# Patient Record
Sex: FEMALE | Race: BLACK OR AFRICAN AMERICAN
[De-identification: names, ages, dates, MRNs, and addresses within clinical notes are randomized per-mention and may not be internally consistent; named-entity substitution may affect disease eponyms.]

---

## 2018-10-12 ENCOUNTER — HOSPITAL ENCOUNTER (EMERGENCY)
Dept: HOSPITAL 61 - ER | Age: 48
Discharge: HOME | End: 2018-10-12
Payer: COMMERCIAL

## 2018-10-12 VITALS — SYSTOLIC BLOOD PRESSURE: 126 MMHG | DIASTOLIC BLOOD PRESSURE: 68 MMHG

## 2018-10-12 VITALS — BODY MASS INDEX: 43.4 KG/M2 | HEIGHT: 69 IN | WEIGHT: 293 LBS

## 2018-10-12 DIAGNOSIS — N30.01: Primary | ICD-10-CM

## 2018-10-12 DIAGNOSIS — N83.201: ICD-10-CM

## 2018-10-12 DIAGNOSIS — J98.4: ICD-10-CM

## 2018-10-12 DIAGNOSIS — M54.5: ICD-10-CM

## 2018-10-12 DIAGNOSIS — K57.30: ICD-10-CM

## 2018-10-12 DIAGNOSIS — K75.3: ICD-10-CM

## 2018-10-12 DIAGNOSIS — K80.20: ICD-10-CM

## 2018-10-12 LAB
APTT PPP: (no result) S
BACTERIA #/AREA URNS HPF: (no result) /HPF
BILIRUB UR QL STRIP: NEGATIVE
FIBRINOGEN PPP-MCNC: (no result) MG/DL
NITRITE UR QL STRIP: NEGATIVE
PH UR STRIP: 6 [PH]
PROT UR STRIP-MCNC: 100 MG/DL
RBC #/AREA URNS HPF: (no result) /HPF (ref 0–2)
SQUAMOUS #/AREA URNS LPF: (no result) /LPF
U PREG PATIENT: NEGATIVE
UROBILINOGEN UR-MCNC: 1 MG/DL
WBC #/AREA URNS HPF: (no result) /HPF (ref 0–4)

## 2018-10-12 PROCEDURE — 96372 THER/PROPH/DIAG INJ SC/IM: CPT

## 2018-10-12 PROCEDURE — 74176 CT ABD & PELVIS W/O CONTRAST: CPT

## 2018-10-12 PROCEDURE — 81001 URINALYSIS AUTO W/SCOPE: CPT

## 2018-10-12 PROCEDURE — 82962 GLUCOSE BLOOD TEST: CPT

## 2018-10-12 PROCEDURE — 81025 URINE PREGNANCY TEST: CPT

## 2018-10-12 PROCEDURE — 99285 EMERGENCY DEPT VISIT HI MDM: CPT

## 2018-10-12 NOTE — RAD
CT abdomen and pelvis without contrast:

 

Reason for examination: Hematuria.

 

Helical images were obtained through the abdomen pelvis with no 

intravenous or oral contrast. Reconstruction was performed in sagittal and

coronal planes.

 

Exposure: One or more of the following individualized dose reduction 

techniques were utilized for this examination:  1. Automated exposure 

control  2. Adjustment of the mA and/or kV according to patient size  3. 

Use of iterative reconstruction technique.

 

There is a calcified granuloma posteriorly at the left lung base. No 

infiltrates or pleural effusions are seen. The heart size is normal with 

no pericardial effusion evident. Calcified granuloma are seen in the 

subcutaneous carinal region of the mediastinum.

 

There are several calcifications present in the liver and spleen without 

other focal lesions. Gallbladder shows cholelithiasis with a contracted 

gallbladder. No abnormalities are seen at the adrenal glands or pancreas. 

The kidneys show no renal masses, renal calculi, hydronephrosis or 

evidence of obstructive uropathy. No abnormality seen at the appendix. 

There are diverticuli in the superior sigmoid colon but no evidence of 

diverticulitis. The small intestinal tract is not distended and shows no 

obstruction. The stomach shows large amount of gastric content.

 

No abnormality seen at the bladder. IUD is present in the uterus. No 

abnormality is seen at the left ovary. There is a enlarged right ovary 

measuring approximately 4.5 x 4.2 cm in greatest dimension which may 

contain a cyst. Further evaluation with ultrasound should be considered. 

No free fluid is seen in the pelvis. No acute bony abnormalities are seen.

 

IMPRESSION:

 

Hepatic and splenic granuloma and granuloma in the left lung and 

subcarinal mediastinum. Cholelithiasis with contracted gallbladder. No 

renal calculi or obstructive uropathy. Enlarged right ovary measuring 4.5 

cm in greatest dimension which may contain a cyst. No abnormality seen at 

the appendix.

 

Electronically signed by: Pamella Oreilly MD (10/12/2018 3:30 AM) [facility]

## 2018-10-12 NOTE — PHYS DOC
Past Medical History


Past Medical History:  No Pertinent History


Alcohol Use:  None


Drug Use:  None





Adult General


Chief Complaint


Chief Complaint:  BLOOD IN URINE





[facility]





Patient is a 47  year old female who presents with dysuria and hematuria


Patient noted onset of frequency earlier today which progressed to dysuria this 

evening around 8:00 with blood. Tonight at around 11:00 pm, she had severe 

dysuria with pain lower back pain, no fevers or vomiting.


She thinks she has a urinary tract infection and presents for care. Patient is 

diabetic. She denies polyuria or polydipsia or high blood sugars at this time.





Review of Systems


Review of Systems





Constitutional: Denies fever or chills 


Eyes: Denies change in visual acuity, redness, or eye pain 


HENT: Denies nasal congestion or sore throat 


Respiratory: Denies cough or shortness of breath 


Cardiovascular: No additional information not addressed in HPI 


GI: Denies abdominal pain, nausea, vomiting, bloody stools or diarrhea 


: With dysuria and hematuria 


Musculoskeletal: with low back pain, denies joint pain


Integument: Denies rash or skin lesions 


Neurologic: Denies headache, focal weakness or sensory changes 


Endocrine: Denies polyuria or polydipsia 





All other systems were reviewed and found to be within normal limits, except as 

documented in this note.





Current Medications


Current Medications





Current Medications








 Medications


  (Trade)  Dose


 Ordered  Sig/Diego  Start Time


 Stop Time Status Last Admin


Dose Admin


 


 Ceftriaxone Sodium


  (Rocephin Im)  1 gm  1X  ONCE  10/12/18 02:30


 10/12/18 02:31 DC 10/12/18 02:24


1 GM


 


 Phenazopyridine


 HCl


  (Pyridium)  200 mg  1X  ONCE  10/12/18 02:30


 10/12/18 02:31 DC 10/12/18 02:24


200 MG











Allergies


Allergies





Allergies








Coded Allergies Type Severity Reaction Last Updated Verified


 


  No Known Drug Allergies    10/12/18 No











Physical Exam


Physical Exam





Constitutional: Well developed, well nourished, no acute distress, non-toxic 

appearance. 


HENT: Normocephalic, atraumatic, bilateral external ears normal, oropharynx 

moist, no oral exudates, nose normal. 


Eyes: PERRLA, EOMI, conjunctiva normal, no discharge. 


Neck: Normal range of motion, no tenderness, supple, no stridor. 


Cardiovascular:Heart rate regular rhythm, no murmur


Lungs & Thorax:  Bilateral breath sounds clear to auscultation 


Abdomen: Bowel sounds normal, soft, with suprapubic tenderness, no masses, no 

pulsatile masses.  


Skin: Warm, dry, no erythema, no rash. 


Back: with lower back tenderness, no CVA tenderness.  


Extremities: No tenderness, no cyanosis, no clubbing, ROM intact, no edema. 


Neurologic: Alert and oriented X 3, normal motor function, normal sensory 

function, no focal deficits noted. 


Psychologic: Affect normal, judgement normal, mood normal.





Current Patient Data


Vital Signs





 Vital Signs








  Date Time  Temp Pulse Resp B/P (MAP) Pulse Ox O2 Delivery O2 Flow Rate FiO2


 


10/12/18 04:00  87 20 126/68 (87) 96 Room Air  


 


10/12/18 01:34 98.0       





 98.0       








Lab Values





 Laboratory Tests








Test


 10/12/18


00:05 10/12/18


00:25 10/12/18


02:01


 


Urine Pregnancy Test


 Negative (NEG)


 


 





 


Urine Collection Type  Unknown   


 


Urine Color  Red   


 


Urine Clarity  Cloudy   


 


Urine pH  6.0   


 


Urine Specific Gravity  1.025   


 


Urine Protein


 


 100 mg/dL


(NEG-TRACE) 





 


Urine Glucose (UA)


 


 >=1000 mg/dL


(NEG) 





 


Urine Ketones (Stick)


 


 Trace mg/dL


(NEG) 





 


Urine Blood  Large (NEG)   


 


Urine Nitrite


 


 Negative (NEG)


 





 


Urine Bilirubin


 


 Negative (NEG)


 





 


Urine Urobilinogen Dipstick


 


 1.0 mg/dL (0.2


mg/dL) 





 


Urine Leukocyte Esterase


 


 Moderate (NEG)


 





 


Urine RBC


 


 Tntc /HPF


(0-2) 





 


Urine WBC


 


 20-40 /HPF


(0-4) 





 


Urine Squamous Epithelial


Cells 


 Occ /LPF  


 





 


Urine Bacteria


 


 Moderate /HPF


(0-FEW) 





 


Glucose (Fingerstick)


 


 


 116 mg/dL


(70-99)  H











EKG


EKG


[]





Radiology/Procedures


Radiology/Procedures


 [facility]


 [address] [place] [address]


 [phone]


 


 IMAGING REPORT





 Signed





PATIENT: GLENN NOBLES ACCOUNT: [id number] MRN#: [id number]


: 1970 LOCATION: ER AGE: 47


SEX: F EXAM DT: 10/12/18 ACCESSION#: [id number]


STATUS: REG ER ORD. PHYSICIAN: VIPUL REHMAN MD 


REASON: hematuria


PROCEDURE: CT ABDOMEN PELVIS WO CONTRAST





CT abdomen and pelvis without contrast:


 


Reason for examination: Hematuria.


 


Helical images were obtained through the abdomen pelvis with no 


intravenous or oral contrast. Reconstruction was performed in sagittal and


coronal planes.


 


Exposure: One or more of the following individualized dose reduction 


techniques were utilized for this examination:  1. Automated exposure 


control  2. Adjustment of the mA and/or kV according to patient size  3. 


Use of iterative reconstruction technique.


 


There is a calcified granuloma posteriorly at the left lung base. No 


infiltrates or pleural effusions are seen. The heart size is normal with 


no pericardial effusion evident. Calcified granuloma are seen in the 


subcutaneous carinal region of the mediastinum.


 


There are several calcifications present in the liver and spleen without 


other focal lesions. Gallbladder shows cholelithiasis with a contracted 


gallbladder. No abnormalities are seen at the adrenal glands or pancreas. 


The kidneys show no renal masses, renal calculi, hydronephrosis or 


evidence of obstructive uropathy. No abnormality seen at the appendix. 


There are diverticuli in the superior sigmoid colon but no evidence of 


diverticulitis. The small intestinal tract is not distended and shows no 


obstruction. The stomach shows large amount of gastric content.


 


No abnormality seen at the bladder. IUD is present in the uterus. No 


abnormality is seen at the left ovary. There is a enlarged right ovary 


measuring approximately 4.5 x 4.2 cm in greatest dimension which may 


contain a cyst. Further evaluation with ultrasound should be considered. 


No free fluid is seen in the pelvis. No acute bony abnormalities are seen.


 


IMPRESSION:


 


Hepatic and splenic granuloma and granuloma in the left lung and 


subcarinal mediastinum. Cholelithiasis with contracted gallbladder. No 


renal calculi or obstructive uropathy. Enlarged right ovary measuring 4.5 


cm in greatest dimension which may contain a cyst. No abnormality seen at 


the appendix.


 


Electronically signed by: Pamella Jay MD (10/12/2018 3:30 AM) [facility]














DICTATED and SIGNED BY:     PAMELLA JAY MD


DATE:     10/12/18 0315





Course & Med Decision Making


Course & Med Decision Making


Pertinent Labs and Imaging studies reviewed. (See chart for details)





Emergency Department Course


Patient presents with dysuria, hematuria and back pain


DDx-UTI, kidney stone, pyelonephritis





Patient was stable in the ED. U/A showed hematuria with WBCs. Pregnancy test 

negative. POC glucose 116. Abdominal pelvic CT scan showed no kidney stone with 

diverticulosis, right ovarian cyst, gallstones, granulomas in liver spleen and 

left lung. The patient was given Rocephin IM. urine sent for culture. Patient 

given CT scan results and advised to follow-up with PCP and Gyn referral. 

Patient advised to return to the ED if she develops worse pain, fevers, SOB or 

vomiting.





Dragon Disclaimer


Dragon Disclaimer


This electronic medical record was generated, in whole or in part, using a 

voice recognition dictation system.





Departure


Departure


Impression:  


 Primary Impression:  


 UTI (urinary tract infection)


 Additional Impressions:  


 Granuloma of liver


 Lung calcification


 Gall stones


 Diverticulosis


 Right ovarian cyst


Disposition:   HOME, SELF-CARE


Condition:  STABLE


Referrals:  


MALINDA LARA MD (PCP)


Follow-up for further evaluation. Call today for an appointment


Patient Instructions:  Cholelithiasis, Diverticulosis, Ovarian Cyst, Urinary 

Tract Infection





Additional Instructions:  


If you develop worse pain, fevers, vomiting return to the Emergency Department


Scripts


Hydrocodone/Apap 5-325 (NORCO 5-325 TABLET) 1 Each Tablet


1 TAB PO QID, #12 TAB


   Prov: VIPUL REHMAN MD         10/12/18 


Phenazopyridine Hcl (PYRIDIUM) 200 Mg Tablet


200 MG PO TID for 2 Days, #6 TAB


   Prov: VIPUL REHMAN MD         10/12/18 


Cephalexin (KEFLEX) 500 Mg Capsule


1 CAP PO TID, #21 CAP


   Prov: VIPUL REHMAN MD         10/12/18





Problem Qualifiers








 Primary Impression:  


 UTI (urinary tract infection)


 Urinary tract infection type:  acute cystitis  Hematuria presence:  with 

hematuria  Qualified Codes:  N30.01 - Acute cystitis with hematuria


 Additional Impressions:  


 Diverticulosis


 Diverticulosis site:  diverticulosis of large intestine  Diverticulosis 

bleeding:  diverticulosis without bleeding  Qualified Codes:  K57.30 - 

Diverticulosis of large intestine without perforation or abscess without 

bleeding








VIPUL REHMAN MD Oct 12, 2018 01:41